# Patient Record
Sex: FEMALE | Race: ASIAN | URBAN - METROPOLITAN AREA
[De-identification: names, ages, dates, MRNs, and addresses within clinical notes are randomized per-mention and may not be internally consistent; named-entity substitution may affect disease eponyms.]

---

## 2024-06-17 ENCOUNTER — OFFICE VISIT (OUTPATIENT)
Dept: ORTHOPEDIC SURGERY | Age: 24
End: 2024-06-17
Payer: COMMERCIAL

## 2024-06-17 VITALS — WEIGHT: 158 LBS

## 2024-06-17 DIAGNOSIS — M54.31 SCIATICA OF RIGHT SIDE: Primary | ICD-10-CM

## 2024-06-17 PROCEDURE — 99204 OFFICE O/P NEW MOD 45 MIN: CPT | Performed by: PHYSICIAN ASSISTANT

## 2024-06-17 RX ORDER — MELOXICAM 7.5 MG/1
7.5 TABLET ORAL 2 TIMES DAILY PRN
Qty: 60 TABLET | Refills: 2 | Status: SHIPPED | OUTPATIENT
Start: 2024-06-17

## 2024-06-17 RX ORDER — METHYLPREDNISOLONE 4 MG/1
TABLET ORAL
Qty: 1 KIT | Refills: 0 | Status: SHIPPED | OUTPATIENT
Start: 2024-06-17

## 2024-06-17 NOTE — PROGRESS NOTES
Name: Lacie Gaona  YOB: 2000  Gender: female  MRN: [id number]    CC:   Chief Complaint   Patient presents with    New Patient     Low back pain          HPI:   Lacie Gaona is a 23 y.o. female with a PMHx of no significant history    They present here for evaluation of rating pain from the right hip and pelvis down the right posterior leg and thigh.  She has numbness and tingling at times into her foot.  This been going on about 6 weeks.  History was obtained with the use of a  for the patient using a virtual .  Patient states she was seen in urgent care a week or 2 ago and believes she was given some steroids.  This helped her temporarily but her symptoms came back.  She is also been taking Tylenol.  She works as a  at [employer].  No specific trauma at the onset.        Past Medical History Includes: No past medical history on file., No past surgical history on file.  Family History: No family history on file.   Social History:   Social History     Tobacco Use    Smoking status: Never    Smokeless tobacco: Never   Substance Use Topics    Alcohol use: Not on file       ALLERGIES: No Known Allergies     Patient Medications    No current outpatient medications on file.     No current facility-administered medications for this visit.                   Review of Systems:  As per HPI.  Pertinent positives and negatives are addressed with the patient, particularly those related to musculoskeletal concerns.   Other non-emergent concerns were referred back to the primary care physician.      PHYSICAL EXAMINATION:     The patient appears their stated age and they are in no distress.  Wt 71.7 kg (158 lb)         Neuro:   Reflexes  Knees: Normal to diminished  Ankles: Normal to diminished        Sensory    Sensation to light touch intact L3-S1      Motor    Hip Flexion: grossly normal    Knee Extension:  grossly normal    Ankle Dorsiflexion: grossly normal    Great Toe  Partial Purse String (Simple) Text: Given the location of the defect and the characteristics of the surrounding skin a simple purse string closure was deemed most appropriate.  Undermining was performed circumfirentially around the surgical defect.  A purse string suture was then placed and tightened. Wound tension only allowed a partial closure of the circular defect.

## 2024-09-05 RX ORDER — MELOXICAM 7.5 MG/1
7.5 TABLET ORAL 2 TIMES DAILY PRN
Qty: 60 TABLET | Refills: 0 | OUTPATIENT
Start: 2024-09-05